# Patient Record
Sex: MALE | Race: WHITE | NOT HISPANIC OR LATINO | Employment: FULL TIME | URBAN - METROPOLITAN AREA
[De-identification: names, ages, dates, MRNs, and addresses within clinical notes are randomized per-mention and may not be internally consistent; named-entity substitution may affect disease eponyms.]

---
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
## 2023-10-04 ENCOUNTER — HOSPITAL ENCOUNTER (EMERGENCY)
Facility: CLINIC | Age: 23
Discharge: HOME | End: 2023-10-04
Attending: STUDENT IN AN ORGANIZED HEALTH CARE EDUCATION/TRAINING PROGRAM | Admitting: STUDENT IN AN ORGANIZED HEALTH CARE EDUCATION/TRAINING PROGRAM
Payer: COMMERCIAL

## 2023-10-04 ENCOUNTER — APPOINTMENT (OUTPATIENT)
Dept: GENERAL RADIOLOGY | Facility: CLINIC | Age: 23
End: 2023-10-04
Attending: STUDENT IN AN ORGANIZED HEALTH CARE EDUCATION/TRAINING PROGRAM

## 2023-10-04 VITALS
RESPIRATION RATE: 20 BRPM | TEMPERATURE: 98 F | HEIGHT: 69 IN | SYSTOLIC BLOOD PRESSURE: 141 MMHG | WEIGHT: 260 LBS | OXYGEN SATURATION: 100 % | BODY MASS INDEX: 38.51 KG/M2 | DIASTOLIC BLOOD PRESSURE: 79 MMHG | HEART RATE: 80 BPM

## 2023-10-04 DIAGNOSIS — R20.2 PARESTHESIA OF LEFT ARM: ICD-10-CM

## 2023-10-04 DIAGNOSIS — M25.512 ACUTE PAIN OF LEFT SHOULDER: Primary | ICD-10-CM
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
## 2023-10-04 PROCEDURE — 99283 EMERGENCY DEPT VISIT LOW MDM: CPT

## 2023-10-04 PROCEDURE — 250N000013 HC RX MED GY IP 250 OP 250 PS 637: Performed by: STUDENT IN AN ORGANIZED HEALTH CARE EDUCATION/TRAINING PROGRAM

## 2023-10-04 PROCEDURE — 73030 X-RAY EXAM OF SHOULDER: CPT | Mod: LT

## 2023-10-04 RX ORDER — OXYCODONE AND ACETAMINOPHEN 5; 325 MG/1; MG/1
1 TABLET ORAL ONCE
Status: COMPLETED | OUTPATIENT
Start: 2023-10-04 | End: 2023-10-04

## 2023-10-04 RX ADMIN — OXYCODONE HYDROCHLORIDE AND ACETAMINOPHEN 1 TABLET: 5; 325 TABLET ORAL at 14:28

## 2023-10-04 NOTE — ED PROVIDER NOTES
"History   Chief Complaint:  Arm Pain     HPI:  Angel Bell is a very pleasant 23 year old male presenting with left upper extremity pain after an injury at work. The patient states two 40 lb boxes filled with tortillas landed on his left shoulder. Afterwards he noted his left fourth and fifth fingers were numb and he now has difficulty moving them. He endorses pain to his upper left arm and shoulder. He has pain with range of motion of his shoulder. Injury occurred at approximately 1130 today. There are no other injuries.      Independent Historian:   None - Patient Only    Review of External Notes:   None.    Medications:    The patient is not currently taking any prescribed medications.    Past Medical History:    PTSD  Major depressive disorder   Adjustment disorder with anxiety and depressed mood   Abdominal hernia   Lumbago   Obesity     Past Surgical History:    Herniorrhaphy   Appendectomy      Physical Exam   Patient Vitals for the past 24 hrs:   BP Temp Pulse Resp SpO2 Height Weight   10/04/23 1418 (!) 141/79 98  F (36.7  C) 80 20 100 % 1.753 m (5' 9\") 117.9 kg (260 lb)      Physical Exam  General: Well appearing, no acute distress   HENT: Atraumatic, normocephalic  Eyes: Extraocular movements intact  Cardiovascular: Regular rate  Pulmonary: Easy work of breathing  MSK: Tender around glenohumeral joint and left upper humerus. Pain with range of motion. Sensation and  intact to left hand. Brisk cap refill.    Skin: Warm and dry  Neuro: Alert and oriented  Psych: Cooperative, appropriate affect    Emergency Department Course   ECG  None performed    Imaging:  XR Shoulder Left G/E 3 Views   Final Result   Impression:      1.  Normal left shoulder radiographs. Normal joint alignment. Negative   for fracture.      ARTEMIO CHAIREZ MD            SYSTEM ID:  [id number]       Report per radiology    Laboratory:  Labs Ordered and Resulted from Time of ED Arrival to Time of ED Departure - No data to " display     Procedures   None performed    Emergency Department Course & Assessments:     Interventions:  Medications   oxyCODONE-acetaminophen (PERCOCET) 5-325 MG per tablet 1 tablet (1 tablet Oral $Given 10/4/23 1428)      Assessments:  1421 I obtained history and performed an exam of the patient as documented above.   1536 I rechecked the patient and explained findings. Patient's pain has improved after interventions given above. Discussed plan of care.     Independent Interpretation (X-rays, CTs, rhythm strip):  I independently interpreted the patient's left shoulder x-ray; reassuring against fracture or dislocation.     Consultations/Discussion of Management or Tests:  None    Social Determinants of Health affecting care:   None.      Disposition:  The patient was discharged to home.     Impression & Plan   CMS Diagnoses: None    Medical Decision Making:  ED Course as of 10/04/23 1628   Wed Oct 04, 2023   1540 Patient presenting with left shoulder pain and numbness and tingling in ulnar nerve distribution of left hand after injury at work.  Considered fracture, dislocation, muscular strain.  X-ray was reassuring.  Patient's neurologic exam is also reassuring, with no objective sensory deficit or weakness.  Will recommend patient be discharged with primary care follow-up, symptomatic management at home with acetaminophen and ibuprofen, sling for comfort. Findings were discussed.   Additional verbal instructions were provided.   I discussed specific warning signs and instructed the patient to return to the emergency department if there are any concerns.  Understanding of instructions was voiced, questions were answered and the patient was discharged.      Critical Care time was 0 minutes for this patient excluding procedures.    Diagnosis:    ICD-10-CM    1. Acute pain of left shoulder  M25.512       2. Paresthesia of left arm  R20.2       3. Work related injury  Y99.0          Scribe Disclosure:  I, Yazmin Preston,  am serving as a scribe at 3:34 PM on 10/4/2023 to document services personally performed by Jose Gill MD based on my observations and the provider's statements to me.      Jose Gill MD  10/04/23 6819

## 2023-10-04 NOTE — ED NOTES
"PIT/Triage Evaluation    Patient presented with left upper extremity pain after an injury at work. The patient states two 40 lb boxes filled with tortillas landed on his left shoulder. Afterwards he noted his left fourth and fifth fingers were numb and he now has difficulty moving them. He endorses pain to his upper left arm and shoulder. He has pain with range of motion of his shoulder. Injury occurred at approximately 1130 today. No other injuries.     Exam is notable for:    Patient Vitals for the past 24 hrs:   BP Temp Pulse Resp SpO2 Height Weight   10/04/23 1418 (!) 141/79 98  F (36.7  C) 80 20 100 % 1.753 m (5' 9\") 117.9 kg (260 lb)     MSK:Tender around glenohumeral joint and left upper humerus. Pain with range of motion. Sensation and  intact to left hand. Brisk cap refill.     Appropriate interventions for symptom management were initiated if applicable.  Appropriate diagnostic tests were initiated if indicated.    Important information for subsequent clinician:  XR ordered    I briefly evaluated the patient and developed an initial plan of care. I discussed this plan and explained that this brief interaction does not constitute a full evaluation. Patient/family understands that they should wait to be fully evaluated and discuss any test results with another clinician prior to leaving the hospital.     Jose Gill MD  10/04/23 0826    "

## 2023-10-04 NOTE — Clinical Note
Angel Bell was seen and treated in our emergency department on 10/4/2023.  He may return to work on 10/05/2023.  Mr Bell can return to work tomorrow.  I would recommend a 10 pound weight restriction on lifting with his left arm until cleared by his primary care doctor.      If you have any questions or concerns, please don't hesitate to call.      Jose Gill MD

## 2023-10-04 NOTE — ED TRIAGE NOTES
At work a rack fell and 3 boxes approx 40# each fell on lt shoulder, no other injuries, pain to lt shoulder and numbness to lt pinky and ring finger are numb   Triage Assessment       Row Name 10/04/23 3363       Triage Assessment (Adult)    Airway WDL WDL       Respiratory WDL    Respiratory WDL WDL       Cardiac WDL    Cardiac WDL WDL       Cognitive/Neuro/Behavioral WDL    Cognitive/Neuro/Behavioral WDL WDL

## 2023-10-04 NOTE — DISCHARGE INSTRUCTIONS
Thank you for allowing us to evaluate you today.  Follow up with primary care clinician  in 1 week for reevaluation..  For pain, use acetaminophen (Tylenol) and ibuprofen.  Please read the guidance provided with your discharge instructions.  Immediately return to the emergency department with any concerns.

## 2024-03-18 ENCOUNTER — HOSPITAL ENCOUNTER (EMERGENCY)
Facility: CLINIC | Age: 24
Discharge: HOME | End: 2024-03-18
Admitting: PHYSICIAN ASSISTANT

## 2024-03-18 ENCOUNTER — APPOINTMENT (OUTPATIENT)
Dept: GENERAL RADIOLOGY | Facility: CLINIC | Age: 24
End: 2024-03-18
Attending: PHYSICIAN ASSISTANT

## 2024-03-18 VITALS
SYSTOLIC BLOOD PRESSURE: 126 MMHG | RESPIRATION RATE: 18 BRPM | DIASTOLIC BLOOD PRESSURE: 85 MMHG | BODY MASS INDEX: 40.7 KG/M2 | OXYGEN SATURATION: 100 % | HEIGHT: 69 IN | TEMPERATURE: 100 F | WEIGHT: 274.8 LBS | HEART RATE: 95 BPM

## 2024-03-18 DIAGNOSIS — J10.1 INFLUENZA B: ICD-10-CM

## 2024-03-18 LAB
FLUAV RNA SPEC QL NAA+PROBE: NEGATIVE
FLUBV RNA RESP QL NAA+PROBE: POSITIVE
GLUCOSE BLDC GLUCOMTR-MCNC: 91 MG/DL (ref 70–99)
GROUP A STREP BY PCR: NOT DETECTED
RSV RNA SPEC NAA+PROBE: NEGATIVE
SARS-COV-2 RNA RESP QL NAA+PROBE: NEGATIVE

## 2024-03-18 PROCEDURE — 87637 SARSCOV2&INF A&B&RSV AMP PRB: CPT | Performed by: PHYSICIAN ASSISTANT

## 2024-03-18 PROCEDURE — 250N000013 HC RX MED GY IP 250 OP 250 PS 637: Performed by: PHYSICIAN ASSISTANT

## 2024-03-18 PROCEDURE — 87651 STREP A DNA AMP PROBE: CPT | Performed by: PHYSICIAN ASSISTANT

## 2024-03-18 PROCEDURE — 99284 EMERGENCY DEPT VISIT MOD MDM: CPT | Mod: 25 | Performed by: PHYSICIAN ASSISTANT

## 2024-03-18 PROCEDURE — 82962 GLUCOSE BLOOD TEST: CPT

## 2024-03-18 PROCEDURE — 71046 X-RAY EXAM CHEST 2 VIEWS: CPT

## 2024-03-18 PROCEDURE — 99284 EMERGENCY DEPT VISIT MOD MDM: CPT | Performed by: PHYSICIAN ASSISTANT

## 2024-03-18 RX ORDER — ACETAMINOPHEN 500 MG
1000 TABLET ORAL ONCE
Status: COMPLETED | OUTPATIENT
Start: 2024-03-18 | End: 2024-03-18

## 2024-03-18 RX ADMIN — ACETAMINOPHEN 1000 MG: 500 TABLET ORAL at 15:22

## 2024-03-18 ASSESSMENT — COLUMBIA-SUICIDE SEVERITY RATING SCALE - C-SSRS
6. HAVE YOU EVER DONE ANYTHING, STARTED TO DO ANYTHING, OR PREPARED TO DO ANYTHING TO END YOUR LIFE?: NO
1. IN THE PAST MONTH, HAVE YOU WISHED YOU WERE DEAD OR WISHED YOU COULD GO TO SLEEP AND NOT WAKE UP?: NO
2. HAVE YOU ACTUALLY HAD ANY THOUGHTS OF KILLING YOURSELF IN THE PAST MONTH?: NO

## 2024-03-18 ASSESSMENT — ACTIVITIES OF DAILY LIVING (ADL): ADLS_ACUITY_SCORE: 35

## 2024-03-18 ASSESSMENT — ENCOUNTER SYMPTOMS: SORE THROAT: 1

## 2024-03-18 NOTE — Clinical Note
"Angel Bell was seen and treated in our emergency department on 3/18/2024.  He may return to work on 03/22/2024.  Mr. Bell has tested positive for Influenza (\"the Flu\"). Please excuse him from work.      If you have any questions or concerns, please don't hesitate to call.      Quinn Catalan PA-C"

## 2024-03-18 NOTE — ED PROVIDER NOTES
"ED Provider Note  [facility]      History     Chief Complaint   Patient presents with    Pharyngitis     Cough, sore throat, chest congestion since Friday morning.       Pharyngitis    22yo M no pmhx p/w URI symptoms x 3 days. Reports sore throat, congestion, rhinorrhea, subjective fevers, nonproductive cough.  Denies headache, myalgias, abdominal pain, nausea, vomiting, diarrhea, CP, SOB.  Has been taking APAP for symptoms.  Mother also questioning why he has dark skin on his anterior neck, which is a chronic concern.    Past Medical History  Past Medical History:   Diagnosis Date    Hernia of unspecified site of abdominal cavity without mention of obstruction or gangrene      Past Surgical History:   Procedure Laterality Date    APPENDECTOMY OPEN CHILD      HERNIORRHAPHY INCISIONAL (LOCATION)  6/14/2011    Procedure:HERNIORRHAPHY INCISIONAL (LOCATION); Surgeon:JAQUAN ROSALES; Location:[facility] OR     No current outpatient medications on file.    No Known Allergies  Family History  History reviewed. No pertinent family history.  Social History   Social History     Tobacco Use    Smoking status: Never    Smokeless tobacco: Never   Substance Use Topics    Alcohol use: No    Drug use: No         A medically appropriate review of systems was performed with pertinent positives and negatives noted in the HPI, and all other systems negative.    Physical Exam   BP: 128/84  Pulse: (!) 122  Temp: 100  F (37.8  C)  Resp: 18  Height: 175.3 cm (5' 9\")  Weight: 124.6 kg (274 lb 12.8 oz)  SpO2: 95 %  Physical Exam  Constitutional:       General: He is not in acute distress.     Appearance: He is well-developed. He is ill-appearing. He is not toxic-appearing or diaphoretic.   HENT:      Head: Normocephalic and atraumatic.      Right Ear: Tympanic membrane normal.      Left Ear: Tympanic membrane normal.      Nose: No congestion.      Mouth/Throat:      Mouth: Mucous membranes are moist.      Pharynx: " Uvula midline. Pharyngeal swelling present. No oropharyngeal exudate, posterior oropharyngeal erythema or uvula swelling.      Tonsils: No tonsillar exudate or tonsillar abscesses.   Eyes:      Conjunctiva/sclera: Conjunctivae normal.   Cardiovascular:      Rate and Rhythm: Normal rate and regular rhythm.   Pulmonary:      Effort: Pulmonary effort is normal.      Breath sounds: Normal breath sounds.   Musculoskeletal:      Cervical back: Normal range of motion and neck supple.   Skin:     General: Skin is warm and dry.      Capillary Refill: Capillary refill takes less than 2 seconds.      Findings: No rash.          Neurological:      Mental Status: He is alert and oriented to person, place, and time.           ED Course, Procedures, & Data      Procedures               Results for orders placed or performed during the hospital encounter of 03/18/24   Chest XR,  PA & LAT     Status: None    Narrative    CHEST TWO VIEWS 3/18/2024 2:57 PM     HISTORY: chest congestion    COMPARISON: None.       Impression    IMPRESSION: No acute cardiopulmonary abnormality.    DINAH VICENTE MD         SYSTEM ID:  [id number]   Symptomatic Influenza A/B, RSV, & SARS-CoV2 PCR (COVID-19) Nasopharyngeal     Status: Abnormal    Specimen: Nasopharyngeal; Swab   Result Value Ref Range    Influenza A PCR Negative Negative    Influenza B PCR Positive (A) Negative    RSV PCR Negative Negative    SARS CoV2 PCR Negative Negative    Narrative    Testing was performed using the Xpert Xpress CoV2/Flu/RSV Assay on the [employer] GeneXpert Instrument. This test should be ordered for the detection of SARS-CoV-2, influenza, and RSV viruses in individuals who meet clinical and/or epidemiological criteria. Test performance is unknown in asymptomatic patients. This test is for in vitro diagnostic use under the FDA EUA for laboratories certified under CLIA to perform high or moderate complexity testing. This test has not been FDA cleared or approved. A  negative result does not rule out the presence of PCR inhibitors in the specimen or target RNA in concentration below the limit of detection for the assay. If only one viral target is positive but coinfection with multiple targets is suspected, the sample should be re-tested with another FDA cleared, approved, or authorized test, if coinfection would change clinical management. This test was validated by the [facility]. These laboratories are certified under the Clinical Laboratory Improvement Amendments of 1988 (CLIA-88) as qualified to perform high complexity laboratory testing.   Glucose by meter     Status: Normal   Result Value Ref Range    GLUCOSE BY METER POCT 91 70 - 99 mg/dL   Group A Streptococcus PCR Throat Swab     Status: Normal    Specimen: Throat; Swab   Result Value Ref Range    Group A strep by PCR Not Detected Not Detected    Narrative    The Xpert Xpress Strep A test, performed on the [employer], is a rapid, qualitative in vitro diagnostic test for the detection of Streptococcus pyogenes (Group A ß-hemolytic Streptococcus, Strep A) in throat swab specimens from patients with signs and symptoms of pharyngitis. The Xpert Xpress Strep A test can be used as an aid in the diagnosis of Group A Streptococcal pharyngitis. The assay is not intended to monitor treatment for Group A Streptococcus infections. The Xpert Xpress Strep A test utilizes an automated real-time polymerase chain reaction (PCR) to detect Streptococcus pyogenes DNA.     Medications   acetaminophen (TYLENOL) tablet 1,000 mg (1,000 mg Oral $Given 3/18/24 1522)     Labs Ordered and Resulted from Time of ED Arrival to Time of ED Departure   INFLUENZA A/B, RSV, & SARS-COV2 PCR - Abnormal       Result Value    Influenza A PCR Negative      Influenza B PCR Positive (*)     RSV PCR Negative      SARS CoV2 PCR Negative     GLUCOSE BY METER - Normal    GLUCOSE BY METER POCT 91     GROUP A STREPTOCOCCUS PCR  THROAT SWAB - Normal    Group A strep by PCR Not Detected     GLUCOSE MONITOR NURSING POCT     Chest XR,  PA & LAT   Final Result   IMPRESSION: No acute cardiopulmonary abnormality.      DINAH VICENTE MD            SYSTEM ID:  [id number]             Critical care was not performed.     Medical Decision Making  The patient's presentation was of moderate complexity (an acute illness with systemic symptoms).    The patient's evaluation involved:  review of external note(s) from 3+ sources (prior clinic and ED )  ordering and/or review of 3+ test(s) in this encounter (see separate area of note for details)    The patient's management necessitated high risk (a decision regarding hospitalization).    Assessment & Plan    22yo M no pmhx p/w URI symptoms x 3 days. (+)sore throat, congestion, rhinorrhea, subjective fevers, nonproductive cough.  Tolerating p.o.  Mother also questioning why he has dark skin on his anterior neck, which is a chronic concern.    In ED patient appears ill but nontoxic.  He is borderline febrile, was initially tachycardic but normalized.  Exam is significant for enlarged tonsils, without exudates.  No sublingual swelling to suggest Jose's, low suspicion RPA.  Found to be influenza B positive.  COVID, RSV, strep, chest x-ray negative.  Patient does have dark hued skin on his anterior neck, that appears consistent with acanthosis nigricans.  Point-of-care blood sugar was obtained and 97.     Symptoms consistent with influenza.  Discharged with symptomatic care instructions, PCP follow-up, ER return precautions.    I have reviewed the nursing notes. I have reviewed the findings, diagnosis, plan and need for follow up with the patient.    New Prescriptions    No medications on file       Final diagnoses:   Influenza B         Quinn Catalan PA-C  [facility] EMERGENCY DEPARTMENT  3/18/2024     Quinn Catalan PA-C  03/18/24 1538

## 2024-03-18 NOTE — ED TRIAGE NOTES
Triage Assessment (Adult)       Row Name 03/18/24 7653          Triage Assessment    Airway WDL WDL        Respiratory WDL    Respiratory WDL X;cough     Cough Type congested        Skin Circulation/Temperature WDL    Skin Circulation/Temperature WDL WDL        Cardiac WDL    Cardiac WDL WDL        Peripheral/Neurovascular WDL    Peripheral Neurovascular WDL WDL        Cognitive/Neuro/Behavioral WDL    Cognitive/Neuro/Behavioral WDL WDL

## 2025-02-05 ENCOUNTER — HOSPITAL ENCOUNTER (EMERGENCY)
Facility: CLINIC | Age: 25
Discharge: HOME | End: 2025-02-05
Attending: EMERGENCY MEDICINE | Admitting: EMERGENCY MEDICINE

## 2025-02-05 VITALS
HEART RATE: 80 BPM | TEMPERATURE: 97.8 F | RESPIRATION RATE: 16 BRPM | WEIGHT: 291 LBS | DIASTOLIC BLOOD PRESSURE: 86 MMHG | SYSTOLIC BLOOD PRESSURE: 119 MMHG | HEIGHT: 69 IN | OXYGEN SATURATION: 97 % | BODY MASS INDEX: 43.1 KG/M2

## 2025-02-05 DIAGNOSIS — R07.9 CHEST PAIN, UNSPECIFIED TYPE: ICD-10-CM

## 2025-02-05 LAB
ANION GAP SERPL CALCULATED.3IONS-SCNC: 11 MMOL/L (ref 7–15)
ATRIAL RATE - MUSE: 73 BPM
BASOPHILS # BLD AUTO: 0 10E3/UL (ref 0–0.2)
BASOPHILS NFR BLD AUTO: 1 %
BUN SERPL-MCNC: 8.1 MG/DL (ref 6–20)
CALCIUM SERPL-MCNC: 9.1 MG/DL (ref 8.8–10.4)
CHLORIDE SERPL-SCNC: 106 MMOL/L (ref 98–107)
CREAT SERPL-MCNC: 0.51 MG/DL (ref 0.67–1.17)
DIASTOLIC BLOOD PRESSURE - MUSE: NORMAL MMHG
EGFRCR SERPLBLD CKD-EPI 2021: >90 ML/MIN/1.73M2
EOSINOPHIL # BLD AUTO: 0.1 10E3/UL (ref 0–0.7)
EOSINOPHIL NFR BLD AUTO: 1 %
ERYTHROCYTE [DISTWIDTH] IN BLOOD BY AUTOMATED COUNT: 12.3 % (ref 10–15)
GLUCOSE SERPL-MCNC: 123 MG/DL (ref 70–99)
HCO3 SERPL-SCNC: 26 MMOL/L (ref 22–29)
HCT VFR BLD AUTO: 43.1 % (ref 40–53)
HGB BLD-MCNC: 14.8 G/DL (ref 13.3–17.7)
IMM GRANULOCYTES # BLD: 0 10E3/UL
IMM GRANULOCYTES NFR BLD: 0 %
INTERPRETATION ECG - MUSE: NORMAL
LYMPHOCYTES # BLD AUTO: 2 10E3/UL (ref 0.8–5.3)
LYMPHOCYTES NFR BLD AUTO: 32 %
MCH RBC QN AUTO: 29.4 PG (ref 26.5–33)
MCHC RBC AUTO-ENTMCNC: 34.3 G/DL (ref 31.5–36.5)
MCV RBC AUTO: 86 FL (ref 78–100)
MONOCYTES # BLD AUTO: 0.6 10E3/UL (ref 0–1.3)
MONOCYTES NFR BLD AUTO: 9 %
NEUTROPHILS # BLD AUTO: 3.5 10E3/UL (ref 1.6–8.3)
NEUTROPHILS NFR BLD AUTO: 57 %
NRBC # BLD AUTO: 0 10E3/UL
NRBC BLD AUTO-RTO: 0 /100
P AXIS - MUSE: -1 DEGREES
PLATELET # BLD AUTO: 208 10E3/UL (ref 150–450)
POTASSIUM SERPL-SCNC: 3.8 MMOL/L (ref 3.4–5.3)
PR INTERVAL - MUSE: 120 MS
QRS DURATION - MUSE: 110 MS
QT - MUSE: 386 MS
QTC - MUSE: 425 MS
R AXIS - MUSE: 26 DEGREES
RBC # BLD AUTO: 5.03 10E6/UL (ref 4.4–5.9)
SODIUM SERPL-SCNC: 143 MMOL/L (ref 135–145)
SYSTOLIC BLOOD PRESSURE - MUSE: NORMAL MMHG
T AXIS - MUSE: 29 DEGREES
TROPONIN T SERPL HS-MCNC: <6 NG/L
VENTRICULAR RATE- MUSE: 73 BPM
WBC # BLD AUTO: 6.2 10E3/UL (ref 4–11)

## 2025-02-05 PROCEDURE — 84132 ASSAY OF SERUM POTASSIUM: CPT | Performed by: EMERGENCY MEDICINE

## 2025-02-05 PROCEDURE — 80048 BASIC METABOLIC PNL TOTAL CA: CPT | Performed by: EMERGENCY MEDICINE

## 2025-02-05 PROCEDURE — 85048 AUTOMATED LEUKOCYTE COUNT: CPT | Performed by: EMERGENCY MEDICINE

## 2025-02-05 PROCEDURE — 82565 ASSAY OF CREATININE: CPT | Performed by: EMERGENCY MEDICINE

## 2025-02-05 PROCEDURE — 84484 ASSAY OF TROPONIN QUANT: CPT | Performed by: EMERGENCY MEDICINE

## 2025-02-05 PROCEDURE — 99285 EMERGENCY DEPT VISIT HI MDM: CPT | Mod: 25 | Performed by: EMERGENCY MEDICINE

## 2025-02-05 PROCEDURE — 93010 ELECTROCARDIOGRAM REPORT: CPT | Performed by: EMERGENCY MEDICINE

## 2025-02-05 PROCEDURE — 99283 EMERGENCY DEPT VISIT LOW MDM: CPT | Performed by: EMERGENCY MEDICINE

## 2025-02-05 PROCEDURE — 93005 ELECTROCARDIOGRAM TRACING: CPT | Performed by: EMERGENCY MEDICINE

## 2025-02-05 PROCEDURE — 36415 COLL VENOUS BLD VENIPUNCTURE: CPT | Performed by: EMERGENCY MEDICINE

## 2025-02-05 PROCEDURE — 85004 AUTOMATED DIFF WBC COUNT: CPT | Performed by: EMERGENCY MEDICINE

## 2025-02-05 RX ORDER — KETOROLAC TROMETHAMINE 15 MG/ML
15 INJECTION, SOLUTION INTRAMUSCULAR; INTRAVENOUS ONCE
Status: DISCONTINUED | OUTPATIENT
Start: 2025-02-05 | End: 2025-02-05

## 2025-02-05 RX ORDER — KETOROLAC TROMETHAMINE 15 MG/ML
15 INJECTION, SOLUTION INTRAMUSCULAR; INTRAVENOUS ONCE
Status: COMPLETED | OUTPATIENT
Start: 2025-02-05 | End: 2025-02-05

## 2025-02-05 ASSESSMENT — ACTIVITIES OF DAILY LIVING (ADL)
ADLS_ACUITY_SCORE: 41

## 2025-02-05 ASSESSMENT — COLUMBIA-SUICIDE SEVERITY RATING SCALE - C-SSRS
2. HAVE YOU ACTUALLY HAD ANY THOUGHTS OF KILLING YOURSELF IN THE PAST MONTH?: NO
6. HAVE YOU EVER DONE ANYTHING, STARTED TO DO ANYTHING, OR PREPARED TO DO ANYTHING TO END YOUR LIFE?: NO
1. IN THE PAST MONTH, HAVE YOU WISHED YOU WERE DEAD OR WISHED YOU COULD GO TO SLEEP AND NOT WAKE UP?: NO

## 2025-02-05 NOTE — ED TRIAGE NOTES
Pt states that the pain is occurring more than 6 x a day     Triage Assessment (Adult)       Row Name 02/05/25 0857          Triage Assessment    Airway WDL WDL        Respiratory WDL    Respiratory WDL WDL        Skin Circulation/Temperature WDL    Skin Circulation/Temperature WDL WDL        Cardiac WDL    Cardiac WDL X;chest pain        Chest Pain Assessment    Chest Pain Location anterior chest, left     Chest Pain Radiation shoulder     Associated Signs/Symptoms dizziness        Peripheral/Neurovascular WDL    Peripheral Neurovascular WDL WDL        Cognitive/Neuro/Behavioral WDL    Cognitive/Neuro/Behavioral WDL WDL

## 2025-02-05 NOTE — ED PROVIDER NOTES
"ED Provider Note  [facility]      History     Chief Complaint   Patient presents with    Chest Pain     Pt reports intermittent chest pain x 10 days. He states that it a sharp pain which resolves spontaneously but it is getting worse     HPI  Angel Bell is a 24 year old male who presents to the emergency department for intermittent chest pain x 10 days. He reports episodes of left sided non radiating chest pain. Pain starts and resolves spontaneously. It lasts a few seconds to around a minute when it occurs. No triggers or association with activity or movement or position. When it occurs he feels short of breath as well. Today he felt like his left forearm and hand were tingling when this occurred this morning so he came to the ED. No recent surgery or travel. No known history of heart disease.     Past Medical History  Past Medical History:   Diagnosis Date    Hernia of unspecified site of abdominal cavity without mention of obstruction or gangrene      Past Surgical History:   Procedure Laterality Date    APPENDECTOMY OPEN CHILD      HERNIORRHAPHY INCISIONAL (LOCATION)  6/14/2011    Procedure:HERNIORRHAPHY INCISIONAL (LOCATION); Surgeon:JAQUAN ROSALES; Location:[facility]     No current outpatient medications on file.    No Known Allergies  Family History  No family history on file.  Social History   Social History     Tobacco Use    Smoking status: Never    Smokeless tobacco: Never   Substance Use Topics    Alcohol use: No    Drug use: No      A medically appropriate review of systems was performed with pertinent positives and negatives noted in the HPI, and all other systems negative.    Physical Exam   BP: 119/86  Pulse: 80  Temp: 97.8  F (36.6  C)  Resp: 16  Height: 175.3 cm (5' 9\")  Weight: 132 kg (291 lb)  SpO2: 97 %  Physical Exam  Constitutional:       General: He is not in acute distress.     Appearance: He is not toxic-appearing.   HENT:      Head: Normocephalic and " atraumatic.      Nose: Nose normal.      Mouth/Throat:      Mouth: Mucous membranes are moist.      Pharynx: Oropharynx is clear.   Eyes:      Conjunctiva/sclera: Conjunctivae normal.      Pupils: Pupils are equal, round, and reactive to light.   Cardiovascular:      Rate and Rhythm: Normal rate and regular rhythm.      Heart sounds: No murmur heard.     No gallop.   Pulmonary:      Effort: Pulmonary effort is normal. No respiratory distress.      Breath sounds: No wheezing or rales.   Musculoskeletal:      Right lower leg: No edema.      Left lower leg: No edema.   Skin:     General: Skin is warm and dry.   Neurological:      General: No focal deficit present.      Mental Status: He is alert and oriented to person, place, and time.           ED Course, Procedures, & Data      Procedures            EKG Interpretation:      Interpreted by Murali Kramer MD  Time reviewed: 9:15  Symptoms at time of EKG: chest pain   Rhythm: normal sinus   Rate: normal  Axis: normal  Ectopy: none  Conduction: normal  ST Segments/ T Waves: No ST-T wave changes  Q Waves: none    Clinical Impression: normal EKG            Results for orders placed or performed during the hospital encounter of 02/05/25   Chest XR,  PA & LAT     Status: None    Narrative    EXAM: XR CHEST 2 VIEWS  LOCATION: [facility]  DATE: 2/5/2025    INDICATION: chest pain  COMPARISON: 3/18/2024      Impression    IMPRESSION: Lungs are clear. No pleural effusion. Heart size and pulmonary vascularity within normal limits.   Basic metabolic panel     Status: Abnormal   Result Value Ref Range    Sodium 143 135 - 145 mmol/L    Potassium 3.8 3.4 - 5.3 mmol/L    Chloride 106 98 - 107 mmol/L    Carbon Dioxide (CO2) 26 22 - 29 mmol/L    Anion Gap 11 7 - 15 mmol/L    Urea Nitrogen 8.1 6.0 - 20.0 mg/dL    Creatinine 0.51 (L) 0.67 - 1.17 mg/dL    GFR Estimate >90 >60 mL/min/1.73m2    Calcium 9.1 8.8 - 10.4 mg/dL    Glucose 123 (H) 70  - 99 mg/dL   Troponin T, High Sensitivity     Status: Normal   Result Value Ref Range    Troponin T, High Sensitivity <6 <=22 ng/L   CBC with platelets and differential     Status: None   Result Value Ref Range    WBC Count 6.2 4.0 - 11.0 10e3/uL    RBC Count 5.03 4.40 - 5.90 10e6/uL    Hemoglobin 14.8 13.3 - 17.7 g/dL    Hematocrit 43.1 40.0 - 53.0 %    MCV 86 78 - 100 fL    MCH 29.4 26.5 - 33.0 pg    MCHC 34.3 31.5 - 36.5 g/dL    RDW 12.3 10.0 - 15.0 %    Platelet Count 208 150 - 450 10e3/uL    % Neutrophils 57 %    % Lymphocytes 32 %    % Monocytes 9 %    % Eosinophils 1 %    % Basophils 1 %    % Immature Granulocytes 0 %    NRBCs per 100 WBC 0 <1 /100    Absolute Neutrophils 3.5 1.6 - 8.3 10e3/uL    Absolute Lymphocytes 2.0 0.8 - 5.3 10e3/uL    Absolute Monocytes 0.6 0.0 - 1.3 10e3/uL    Absolute Eosinophils 0.1 0.0 - 0.7 10e3/uL    Absolute Basophils 0.0 0.0 - 0.2 10e3/uL    Absolute Immature Granulocytes 0.0 <=0.4 10e3/uL    Absolute NRBCs 0.0 10e3/uL   EKG 12 lead     Status: None   Result Value Ref Range    Systolic Blood Pressure  mmHg    Diastolic Blood Pressure  mmHg    Ventricular Rate 73 BPM    Atrial Rate 73 BPM    AR Interval 120 ms    QRS Duration 110 ms     ms    QTc 425 ms    P Axis -1 degrees    R AXIS 26 degrees    T Axis 29 degrees    Interpretation ECG       Sinus rhythm  Normal ECG    Unconfirmed report - interpretation of this ECG is computer generated - see medical record for final interpretation  Confirmed by - EMERGENCY ROOM, PHYSICIAN ([id number]),  Hugh Bella ([id number]) on 2/5/2025 9:10:20 AM     CBC with platelets differential     Status: None    Narrative    The following orders were created for panel order CBC with platelets differential.  Procedure                               Abnormality         Status                     ---------                               -----------         ------                     CBC with platelets and d...[[id number]]                      Final  result                 Please view results for these tests on the individual orders.     Medications   ketorolac (TORADOL) injection 15 mg (15 mg Intramuscular Not Given 2/5/25 1034)     Labs Ordered and Resulted from Time of ED Arrival to Time of ED Departure   BASIC METABOLIC PANEL - Abnormal       Result Value    Sodium 143      Potassium 3.8      Chloride 106      Carbon Dioxide (CO2) 26      Anion Gap 11      Urea Nitrogen 8.1      Creatinine 0.51 (*)     GFR Estimate >90      Calcium 9.1      Glucose 123 (*)    TROPONIN T, HIGH SENSITIVITY - Normal    Troponin T, High Sensitivity <6     CBC WITH PLATELETS AND DIFFERENTIAL    WBC Count 6.2      RBC Count 5.03      Hemoglobin 14.8      Hematocrit 43.1      MCV 86      MCH 29.4      MCHC 34.3      RDW 12.3      Platelet Count 208      % Neutrophils 57      % Lymphocytes 32      % Monocytes 9      % Eosinophils 1      % Basophils 1      % Immature Granulocytes 0      NRBCs per 100 WBC 0      Absolute Neutrophils 3.5      Absolute Lymphocytes 2.0      Absolute Monocytes 0.6      Absolute Eosinophils 0.1      Absolute Basophils 0.0      Absolute Immature Granulocytes 0.0      Absolute NRBCs 0.0       Chest XR,  PA & LAT   Final Result   IMPRESSION: Lungs are clear. No pleural effusion. Heart size and pulmonary vascularity within normal limits.             Critical care was not performed.     Medical Decision Making  The patient's presentation was of high complexity (an acute health issue posing potential threat to life or bodily function).    The patient's evaluation involved:  ordering and/or review of 3+ test(s) in this encounter (see separate area of note for details)    The patient's management necessitated only low risk treatment.    Assessment & Plan    This is a 24-year-old male presenting with chest pain.  Pain has been intermittent for last week.  No change with exertion or position.  Overall he was well-appearing and vitals are reassuring.  His EKG showed  no acute ischemic findings and his troponin was undetectable.  Overall lower suspicion for acute cardiac cause of his symptoms.  He has a low HEART score. PERC negative as well.  Remainder of his labs are simile reassuring and his chest x-ray showed no acute findings.  He remained asymptomatic here in the emergency department.  Results were discussed with the patient and I recommended he follow-up with his primary care doctor.  He was agreeable with this plan.  Return precautions were discussed and all questions answered.    I have reviewed the nursing notes. I have reviewed the findings, diagnosis, plan and need for follow up with the patient.    New Prescriptions    No medications on file       Final diagnoses:   Chest pain, unspecified type       Murali Kramer MD  [facility] EMERGENCY DEPARTMENT  2/5/2025     Murali Kramer MD  02/05/25 1135

## 2025-02-05 NOTE — Clinical Note
Angel Bell was seen and treated in our emergency department on 2/5/2025.  He may return to work on 02/06/2025.       If you have any questions or concerns, please don't hesitate to call.      Murali Kramer MD